# Patient Record
Sex: FEMALE | Race: WHITE | Employment: PART TIME | URBAN - METROPOLITAN AREA
[De-identification: names, ages, dates, MRNs, and addresses within clinical notes are randomized per-mention and may not be internally consistent; named-entity substitution may affect disease eponyms.]

---

## 2022-12-05 ENCOUNTER — OFFICE VISIT (OUTPATIENT)
Dept: OBGYN CLINIC | Age: 32
End: 2022-12-05
Payer: MEDICAID

## 2022-12-05 VITALS
WEIGHT: 123 LBS | SYSTOLIC BLOOD PRESSURE: 124 MMHG | DIASTOLIC BLOOD PRESSURE: 80 MMHG | HEART RATE: 87 BPM | HEIGHT: 62 IN | BODY MASS INDEX: 22.63 KG/M2

## 2022-12-05 DIAGNOSIS — N94.10 DYSPAREUNIA IN FEMALE: ICD-10-CM

## 2022-12-05 DIAGNOSIS — N80.9 ENDOMETRIOSIS: Primary | ICD-10-CM

## 2022-12-05 DIAGNOSIS — R10.2 PELVIC PAIN IN FEMALE: ICD-10-CM

## 2022-12-05 DIAGNOSIS — N76.0 ACUTE VAGINITIS: ICD-10-CM

## 2022-12-05 DIAGNOSIS — N94.819 VULVODYNIA: ICD-10-CM

## 2022-12-05 DIAGNOSIS — N94.6 SEVERE DYSMENORRHEA: ICD-10-CM

## 2022-12-05 PROCEDURE — 99203 OFFICE O/P NEW LOW 30 MIN: CPT | Performed by: SPECIALIST

## 2022-12-05 RX ORDER — TRAZODONE HYDROCHLORIDE 150 MG/1
TABLET ORAL
COMMUNITY
Start: 2022-10-29

## 2022-12-05 RX ORDER — LEUPROLIDE ACETATE 3.75 MG
3.75 KIT INTRAMUSCULAR ONCE
Qty: 1 KIT | Refills: 3 | Status: SHIPPED | OUTPATIENT
Start: 2022-12-05 | End: 2022-12-05

## 2022-12-05 RX ORDER — DIAZEPAM 5 MG/1
TABLET ORAL
COMMUNITY
Start: 2022-09-27

## 2022-12-05 RX ORDER — FLUCONAZOLE 100 MG/1
100 TABLET ORAL DAILY
Qty: 7 TABLET | Refills: 1 | Status: SHIPPED | OUTPATIENT
Start: 2022-12-05 | End: 2022-12-12

## 2022-12-05 RX ORDER — LIDOCAINE HYDROCHLORIDE 20 MG/ML
JELLY TOPICAL
Qty: 30 ML | Refills: 2 | Status: SHIPPED | OUTPATIENT
Start: 2022-12-05

## 2022-12-05 RX ORDER — BUPRENORPHINE HYDROCHLORIDE AND NALOXONE HYDROCHLORIDE DIHYDRATE 2; .5 MG/1; MG/1
2 TABLET SUBLINGUAL DAILY
COMMUNITY

## 2022-12-05 RX ORDER — AMOXICILLIN 500 MG/1
CAPSULE ORAL
COMMUNITY
Start: 2022-12-01

## 2022-12-05 RX ORDER — IBUPROFEN 800 MG/1
TABLET ORAL
COMMUNITY
Start: 2022-12-01

## 2022-12-05 SDOH — ECONOMIC STABILITY: FOOD INSECURITY: WITHIN THE PAST 12 MONTHS, THE FOOD YOU BOUGHT JUST DIDN'T LAST AND YOU DIDN'T HAVE MONEY TO GET MORE.: NEVER TRUE

## 2022-12-05 SDOH — ECONOMIC STABILITY: FOOD INSECURITY: WITHIN THE PAST 12 MONTHS, YOU WORRIED THAT YOUR FOOD WOULD RUN OUT BEFORE YOU GOT MONEY TO BUY MORE.: NEVER TRUE

## 2022-12-05 ASSESSMENT — PATIENT HEALTH QUESTIONNAIRE - PHQ9
SUM OF ALL RESPONSES TO PHQ QUESTIONS 1-9: 0
2. FEELING DOWN, DEPRESSED OR HOPELESS: 0
SUM OF ALL RESPONSES TO PHQ QUESTIONS 1-9: 0
SUM OF ALL RESPONSES TO PHQ QUESTIONS 1-9: 0
SUM OF ALL RESPONSES TO PHQ9 QUESTIONS 1 & 2: 0
SUM OF ALL RESPONSES TO PHQ QUESTIONS 1-9: 0
1. LITTLE INTEREST OR PLEASURE IN DOING THINGS: 0

## 2022-12-05 ASSESSMENT — ENCOUNTER SYMPTOMS
EYE PAIN: 0
COUGH: 0
APNEA: 0
CONSTIPATION: 0
ABDOMINAL PAIN: 0
DIARRHEA: 0
NAUSEA: 0
VOMITING: 0
ABDOMINAL DISTENTION: 0

## 2022-12-05 ASSESSMENT — SOCIAL DETERMINANTS OF HEALTH (SDOH): HOW HARD IS IT FOR YOU TO PAY FOR THE VERY BASICS LIKE FOOD, HOUSING, MEDICAL CARE, AND HEATING?: NOT HARD AT ALL

## 2022-12-05 NOTE — PROGRESS NOTES
Subjective:      Patient ID: Trav Ramirez is a 29 y.o. female. Chief Complaint   Patient presents with    New Patient     /80   Pulse 87   Ht 5' 2\" (1.575 m)   Wt 123 lb (55.8 kg)   LMP 11/28/2022   BMI 22.50 kg/m²   Patient's last menstrual period was 11/28/2022. No obstetric history on file. Past Medical History:   Diagnosis Date    Anxiety     Depression     Endometriosis      Current Outpatient Medications Ordered in Epic   Medication Sig Dispense Refill    amoxicillin (AMOXIL) 500 MG capsule       buprenorphine-naloxone (SUBOXONE) 2-0.5 MG SUBL Place 2 tablets under the tongue daily. diazePAM (VALIUM) 5 MG tablet       ibuprofen (ADVIL;MOTRIN) 800 MG tablet       traZODone (DESYREL) 150 MG tablet       lidocaine (XYLOCAINE) 2 % jelly Apply as needed 30 mL 2     No current Epic-ordered facility-administered medications on file. Problem List Items Addressed This Visit    None  Visit Diagnoses       Endometriosis    -  Primary    Severe dysmenorrhea        Dyspareunia in female        Pelvic pain in female        Vulvodynia        Acute vaginitis              No Known Allergies  No orders of the defined types were placed in this encounter. HPI  New patient is here today to request a second opinion for endometriosis. She uses birth control pills but she is not getting any relief with her pain. She has had 2 laparoscopies done, 2013 and 2021. She has severe dysmenorrhea and she also has severe pain with intercourse. Patient states that she cannot even have sex at all and avoids any kind of relationships because she is afraid that it may lead to sex and she cannot tolerate the pain. She has pain with both initial penetration and deep penetration. She has bleeding (spotting) every 3 to 4 days. She has been seeing a physician in [place]. She has 1 child and does not want another. The only way she would have a child was if she was  and her  needed a child. She states that she had a \"terrible \" that became infected. She is not . Her other physician wants to do surgery to remove all the scar tissue with a robot and she does not want a robot doing surgery on her. She states that she was told she has so much scar tissue that it has filled her left tube and is adhered to the abdominal wall. Patient has not been treated with Lupron or [place]il[place]. Review of Systems   Constitutional:  Negative for activity change, appetite change and fever. HENT:  Negative for ear discharge and ear pain. Eyes:  Negative for pain and visual disturbance. Respiratory:  Negative for apnea and cough. Cardiovascular:  Negative for chest pain, palpitations and leg swelling. Gastrointestinal:  Negative for abdominal distention, abdominal pain, constipation, diarrhea, nausea and vomiting. Endocrine: Negative. Genitourinary:  Positive for dyspareunia, menstrual problem and pelvic pain. Negative for difficulty urinating and dysuria. Musculoskeletal:  Negative for neck pain and neck stiffness. Skin: Negative. Neurological:  Negative for light-headedness and numbness. Hematological: Negative. Does not bruise/bleed easily. Objective:   Physical Exam  Vitals and nursing note reviewed. Exam conducted with a chaperone present. Constitutional:       Appearance: She is well-developed. HENT:      Head: Normocephalic and atraumatic. Neck:      Thyroid: No thyromegaly. Cardiovascular:      Rate and Rhythm: Normal rate and regular rhythm. Pulmonary:      Effort: Pulmonary effort is normal.      Breath sounds: Normal breath sounds. No wheezing. Abdominal:      General: Bowel sounds are normal. There is no distension. Palpations: Abdomen is soft. There is no mass. Tenderness: There is no abdominal tenderness. There is no guarding. Genitourinary:     Vagina: Vaginal discharge present. Cervix: Discharge present.       Uterus: Tender (dull pain). Comments: Q-tip test reproduces her pain around 4:00  Musculoskeletal:         General: Normal range of motion. Cervical back: Normal range of motion and neck supple. Skin:     General: Skin is dry. Neurological:      Mental Status: She is alert and oriented to person, place, and time. Psychiatric:         Behavior: Behavior normal.         Thought Content: Thought content normal.       Assessment:      Patient with severe dysmenorrhea and dyspareunia with known endometriosis. Explained to patient the benefits of robotic surgery in detail. Patient was also told that initial penetration dyspareunia can also be related to vulvodynia and recommended that she have her physician evaluate for that. Patient requests pelvic examination today. Q-tip test today is positive for vulvodynia. Will treat with lidocaine gel. Surgery was recommended, however patient desires to avoid surgery at this time. Further treatment options with Lupron or [place]ie [place] was discussed with patient. Patient requests treatment with Lupron at this time. Lupron approval will be requested from insurance company. Plan:      Orders Placed This Encounter   Medications    lidocaine (XYLOCAINE) 2 % jelly     Sig: Apply as needed     Dispense:  30 mL     Refill:  2       Appointment pending Lupron approval    IErica, am scribing for, and in the presence of Dr. Beti Davis. Electronically signed by: Erica Pineda 12/5/22 5:02 PM       I agree to the above documentation placed by my scribe Erica Pineda. I reviewed the scribe's note and agree with the documented findings and plan of care. Any areas of disagreement are noted on the chart. I have personally evaluated this patient. Additional findings are as noted. I agree with the chief complaint, past medical history, past surgical history, allergies, medications, social and family history as documented unless otherwise noted below. Electronically signed by Ryan Cid MD on 12/6/2022 at 3:37 AM

## 2022-12-06 PROBLEM — N80.9 ENDOMETRIOSIS: Status: ACTIVE | Noted: 2022-12-06

## 2022-12-06 PROBLEM — N94.819 VULVODYNIA: Status: ACTIVE | Noted: 2022-12-06

## 2022-12-06 PROBLEM — N94.10 DYSPAREUNIA IN FEMALE: Status: ACTIVE | Noted: 2022-12-06

## 2022-12-06 PROBLEM — R10.2 PELVIC PAIN IN FEMALE: Status: ACTIVE | Noted: 2022-12-06

## 2022-12-06 PROBLEM — N94.6 SEVERE DYSMENORRHEA: Status: ACTIVE | Noted: 2022-12-06

## 2022-12-06 PROBLEM — N76.0 ACUTE VAGINITIS: Status: ACTIVE | Noted: 2022-12-06

## 2023-01-17 ENCOUNTER — TELEPHONE (OUTPATIENT)
Dept: OBGYN CLINIC | Age: 33
End: 2023-01-17